# Patient Record
Sex: MALE | Race: WHITE | Employment: FULL TIME | URBAN - NONMETROPOLITAN AREA
[De-identification: names, ages, dates, MRNs, and addresses within clinical notes are randomized per-mention and may not be internally consistent; named-entity substitution may affect disease eponyms.]

---

## 2017-01-25 ENCOUNTER — OFFICE VISIT (OUTPATIENT)
Dept: URGENT CARE | Facility: PHYSICIAN GROUP | Age: 27
End: 2017-01-25
Payer: COMMERCIAL

## 2017-01-25 VITALS
DIASTOLIC BLOOD PRESSURE: 70 MMHG | HEIGHT: 73 IN | HEART RATE: 58 BPM | OXYGEN SATURATION: 97 % | SYSTOLIC BLOOD PRESSURE: 110 MMHG | BODY MASS INDEX: 25.31 KG/M2 | TEMPERATURE: 98.1 F | RESPIRATION RATE: 12 BRPM | WEIGHT: 191 LBS

## 2017-01-25 DIAGNOSIS — L60.0 INGROWN LEFT GREATER TOENAIL: ICD-10-CM

## 2017-01-25 DIAGNOSIS — L60.0 INGROWING TOENAIL WITH INFECTION: ICD-10-CM

## 2017-01-25 PROCEDURE — 99203 OFFICE O/P NEW LOW 30 MIN: CPT | Performed by: PHYSICIAN ASSISTANT

## 2017-01-25 RX ORDER — SULFAMETHOXAZOLE AND TRIMETHOPRIM 800; 160 MG/1; MG/1
1 TABLET ORAL 2 TIMES DAILY
Qty: 20 TAB | Refills: 0 | Status: SHIPPED | OUTPATIENT
Start: 2017-01-25 | End: 2018-05-04

## 2017-01-25 NOTE — PROGRESS NOTES
Chief Complaint   Patient presents with   • Ingrown Toenail     Left great toe 2 months       HISTORY OF PRESENT ILLNESS: Patient is a 26 y.o. male who presents today for evaluation of an ingrown toenail. Patient states he has been fighting the past 2 months. He has been soaking it in warm epsom salt water almost daily since then. He continues to pain, redness, and slight purulent drainage despite the soaking. He stopped soaking it about 3 days ago and the pain, swelling, and erythema became significantly worse. He denies fever, sweats, chills. He has a history of the same on this toe and his other great toe.    There are no active problems to display for this patient.      Allergies:Review of patient's allergies indicates no known allergies.    Current Outpatient Prescriptions Ordered in [facility]   Medication Sig Dispense Refill   • sulfamethoxazole-trimethoprim (BACTRIM DS) 800-160 MG tablet Take 1 Tab by mouth 2 times a day. 20 Tab 0     No current Epic-ordered facility-administered medications on file.       History reviewed. No pertinent past medical history.    Social History   Substance Use Topics   • Smoking status: Never Smoker    • Smokeless tobacco: Never Used   • Alcohol Use: Yes      Comment: occasionally       No family status information on file.   History reviewed. No pertinent family history.    ROS:   Review of Systems   Constitutional: Negative for fever, chills, weight loss and malaise/fatigue.   HENT: Negative for ear pain, nosebleeds, congestion, sore throat and neck pain.    Eyes: Negative for blurred vision.   Respiratory: Negative for cough, sputum production, shortness of breath and wheezing.    Cardiovascular: Negative for chest pain, palpitations, orthopnea and leg swelling.   Gastrointestinal: Negative for heartburn, nausea, vomiting and abdominal pain.   Genitourinary: Negative for dysuria, urgency and frequency.       Exam:  Blood pressure 110/70, pulse 58, temperature 36.7 °C (98.1 °F),  "resp. rate 12, height 1.854 m (6' 1\"), weight 86.637 kg (191 lb), SpO2 97 %.  General: Normal appearing. No distress.  HEENT: Head is grossly normal.  Pulmonary:  No respiratory distress noted.  Cardiovascular: Cap refill is less than 2 seconds on the left great toe.  Extremities: Left great toe with marked erythema, soft tissue swelling or purulent drainage at the medial skin fold of the nail. Significant tenderness to palpation.  Psych: Normal mood. Alert and oriented x3. Judgment and insight is normal.    Assessment/Plan:  Take all medication as directed. Follow-up as needed for nail removal.  1. Ingrown left greater toenail     2. Ingrowing toenail with infection  sulfamethoxazole-trimethoprim (BACTRIM DS) 800-160 MG tablet         "

## 2017-02-01 ENCOUNTER — OFFICE VISIT (OUTPATIENT)
Dept: URGENT CARE | Facility: PHYSICIAN GROUP | Age: 27
End: 2017-02-01
Payer: COMMERCIAL

## 2017-02-01 VITALS
TEMPERATURE: 97.7 F | BODY MASS INDEX: 25.45 KG/M2 | SYSTOLIC BLOOD PRESSURE: 120 MMHG | OXYGEN SATURATION: 95 % | WEIGHT: 192 LBS | DIASTOLIC BLOOD PRESSURE: 70 MMHG | HEART RATE: 71 BPM | HEIGHT: 73 IN | RESPIRATION RATE: 20 BRPM

## 2017-02-01 DIAGNOSIS — L60.0 INGROWN LEFT GREATER TOENAIL: ICD-10-CM

## 2017-02-01 PROCEDURE — 11730 AVULSION NAIL PLATE SIMPLE 1: CPT | Performed by: PHYSICIAN ASSISTANT

## 2017-02-01 NOTE — MR AVS SNAPSHOT
"Yony Her   2017 2:50 PM   Office Visit   MRN: [id number]    Department:  [facility]   Dept Phone:  [phone]    Description:  Male : 1990   Provider:  Kenny Ryan PA-C           Reason for Visit     Toe Pain Left great toenail ingrown      Allergies as of 2017     No Known Allergies      You were diagnosed with     Ingrown left greater toenail   [297339]         Vital Signs     Blood Pressure Pulse Temperature Respirations Height Weight    120/70 mmHg 71 36.5 °C (97.7 °F) 20 1.854 m (6' 1\") 87.091 kg (192 lb)    Body Mass Index Oxygen Saturation Smoking Status             25.34 kg/m2 95% Never Smoker          Basic Information     Date Of Birth Sex Race Ethnicity Preferred Language    1990 Male White Unknown English      Health Maintenance        Date Due Completion Dates    IMM HEP B VACCINE (1 of 3 - Primary Series) 1989 ---    IMM HEP A VACCINE (1 of 2 - Standard Series) 1990 ---    IMM VARICELLA (CHICKENPOX) VACCINE (1 of 2 - 2 Dose Adolescent Series) 2002 ---    IMM DTaP/Tdap/Td Vaccine (1 - Tdap) 2008 ---    IMM INFLUENZA (1) 2016 ---            Current Immunizations     No immunizations on file.      Below and/or attached are the medications your provider expects you to take. Review all of your home medications and newly ordered medications with your provider and/or pharmacist. Follow medication instructions as directed by your provider and/or pharmacist. Please keep your medication list with you and share with your provider. Update the information when medications are discontinued, doses are changed, or new medications (including over-the-counter products) are added; and carry medication information at all times in the event of emergency situations     Allergies:  No Known Allergies          Medications  Valid as of: 2017 -  4:32 PM    Generic Name Brand Name Tablet Size Instructions for use    Sulfamethoxazole-Trimethoprim (Tab) " BACTRIM -160 MG Take 1 Tab by mouth 2 times a day.        .                 Medicines prescribed today were sent to:     [facility] [address] [place] [address], [place] - [address] [facility]    [address] [facility] [address]    Phone: [phone] Fax: [phone]    Open 24 Hours?: No      Medication refill instructions:       If your prescription bottle indicates you have medication refills left, it is not necessary to call your provider’s office. Please contact your pharmacy and they will refill your medication.    If your prescription bottle indicates you do not have any refills left, you may request refills at any time through one of the following ways: The online [email or web address] system (except Urgent Care), by calling your provider’s office, or by asking your pharmacy to contact your provider’s office with a refill request. Medication refills are processed only during regular business hours and may not be available until the next business day. Your provider may request additional information or to have a follow-up visit with you prior to refilling your medication.   *Please Note: Medication refills are assigned a new Rx number when refilled electronically. Your pharmacy may indicate that no refills were authorized even though a new prescription for the same medication is available at the pharmacy. Please request the medicine by name with the pharmacy before contacting your provider for a refill.           [email or web address] Access Code: [email or web address]  Expires: 3/3/2017  4:32 PM    Your email address is not on file at [facility].  Email Addresses are required for you to sign up for [email or web address], please contact [phone] to verify your personal information and to provide your email address prior to attempting to register for [email or web address].    Yony Her  [address] [place] [address]    [email or web address].ma  A secure, online tool to manage your health information     [employer]’s Hmizate.ma® is a secure, online tool that  connects you to your personalized health information from the privacy of your home -- day or night - making it very easy for you to manage your healthcare. Once the activation process is completed, you can even access your medical information using the SafeMedia lou, which is available for free in the Apple Lou store or Google Play store.     To learn more about SafeMedia, visit [email or web address]    There are two levels of access available (as shown below):   My Chart Features  [facility]  Specialists [facility]  Urgent  Care Non-[facility] Doctor   Email your healthcare team securely and privately 24/7 X X X    Manage appointments: schedule your next appointment; view details of past/upcoming appointments X      Request prescription refills. X      View recent personal medical records, including lab and immunizations X X X X   View health record, including health history, allergies, medications X X X X   Read reports about your outpatient visits, procedures, consult and ER notes X X X X   See your discharge summary, which is a recap of your hospital and/or ER visit that includes your diagnosis, lab results, and care plan X X  X     How to register for SafeMedia:  Once your e-mail address has been verified, follow the following steps to sign up for SafeMedia.     1. Go to  [email or web address]  2. Click on the Sign Up Now box, which takes you to the New Member Sign Up page. You will need to provide the following information:  a. Enter your SafeMedia Access Code exactly as it appears at the top of this page. (You will not need to use this code after you’ve completed the sign-up process. If you do not sign up before the expiration date, you must request a new code.)   b. Enter your date of birth.   c. Enter your home email address.   d. Click Submit, and follow the next screen’s instructions.  3. Create a SafeMedia ID. This will be your SafeMedia login ID and cannot be changed, so think of one  that is secure and easy to remember.  4. Create a Pricing Assistant password. You can change your password at any time.  5. Enter your Password Reset Question and Answer. This can be used at a later time if you forget your password.   6. Enter your e-mail address. This allows you to receive e-mail notifications when new information is available in Pricing Assistant.  7. Click Sign Up. You can now view your health information.    For assistance activating your Pricing Assistant account, call [phone]

## 2017-02-02 NOTE — PROGRESS NOTES
This is a 26 y.o. male who presents for removal of ingrowing toenail on the right foot.    HPI: He was placed on antibiotic's a week ago for ingrowing nail with subsequent infection. He is told to come back today if he is still having pain and we would remove the nail. He has had this done multiple times on each foot. This particular one is on his left foot, great toe    No past medical history on file.    No past surgical history on file.    Social History     Social History   • Marital Status: Other     Spouse Name: N/A   • Number of Children: N/A   • Years of Education: N/A     Occupational History   • Not on file.     Social History Main Topics   • Smoking status: Never Smoker    • Smokeless tobacco: Never Used   • Alcohol Use: Yes      Comment: occasionally   • Drug Use: No      Comment: denies h/o use   • Sexual Activity: Not on file     Other Topics Concern   • Not on file     Social History Narrative       No family history on file.    Current Outpatient Prescriptions   Medication Sig Dispense Refill   • sulfamethoxazole-trimethoprim (BACTRIM DS) 800-160 MG tablet Take 1 Tab by mouth 2 times a day. 20 Tab 0     No current facility-administered medications for this visit.       Review of patient's allergies indicates no known allergies.    ROS:    General: Denies fever, chills, acute unexpected weight changes  Skin:  As in HPI  Cardiac:  No recent CP, palpitations, or dyspnea on exertion  Respiratory:  No acute shortness of breath, hemoptysis    PHYSICAL EXAM:  Vital signs reviewed  General:  Well developed, well nourished, NAD  Left lower extremity has ingrowing nail to the medial aspect of the great toe    1. Ingrown left greater toenail         PROCEDURE:    Permit: Procedure, benefits, risks (including those of bleeding, infection, injury, anesthesia, and allergic reaction), and alternatives explained to the patient who voiced understanding of the information. Their questions were sought and answered.  Patient agreed to proceed with the toenail removal.    Indication: Ingrowing nail    Provider: Kenny Ryan PA-C    Anesthesia: Digital block, 4 cc 2% lidocaine without epinephrine    Description: Area prepped and draped in a sterile fashion. Nail removal by blunt dissection from the tissue below, complete removal of nail    Hemostasis / closure: Pressure    Complications: none    Estimated blood loss:  Less than 3 cc.     Disposition: Patient alert, and oriented.  Breathing nonlabored.  Procedure site has been cleaned, dressed, and wound care instructions have been given. Patient is to followup if there is any significant erythema, tenderness, or drainage from the procedure site.

## 2017-04-12 ENCOUNTER — OCCUPATIONAL MEDICINE (OUTPATIENT)
Dept: URGENT CARE | Facility: PHYSICIAN GROUP | Age: 27
End: 2017-04-12

## 2017-04-12 VITALS
HEART RATE: 60 BPM | TEMPERATURE: 98.1 F | SYSTOLIC BLOOD PRESSURE: 110 MMHG | RESPIRATION RATE: 12 BRPM | HEIGHT: 73 IN | WEIGHT: 192 LBS | BODY MASS INDEX: 25.45 KG/M2 | OXYGEN SATURATION: 98 % | DIASTOLIC BLOOD PRESSURE: 70 MMHG

## 2017-04-12 DIAGNOSIS — Z01.10 ENCOUNTER FOR HEARING EXAMINATION: ICD-10-CM

## 2017-04-12 DIAGNOSIS — Z02.1 PRE-EMPLOYMENT EXAMINATION: ICD-10-CM

## 2017-04-12 DIAGNOSIS — Z02.1 PRE-EMPLOYMENT DRUG SCREENING: ICD-10-CM

## 2017-04-12 LAB
AMP AMPHETAMINE: NORMAL
COC COCAINE: NORMAL
INT CON NEG: NORMAL
INT CON POS: NORMAL
MET METHAMPHETAMINES: NORMAL
OPI OPIATES: NORMAL
PCP PHENCYCLIDINE: NORMAL
POC DRUG COMMENT 753798-OCCUPATIONAL HEALTH: NEGATIVE
THC: NORMAL

## 2017-04-12 PROCEDURE — 80305 DRUG TEST PRSMV DIR OPT OBS: CPT | Performed by: PHYSICIAN ASSISTANT

## 2017-04-12 PROCEDURE — 99204 OFFICE O/P NEW MOD 45 MIN: CPT | Performed by: PHYSICIAN ASSISTANT

## 2017-04-12 PROCEDURE — 92553 AUDIOMETRY AIR & BONE: CPT | Performed by: PHYSICIAN ASSISTANT

## 2017-04-12 ASSESSMENT — VISUAL ACUITY
OS_CC: 20/20
OD_CC: 20/100

## 2017-04-12 NOTE — PROGRESS NOTES
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 Patient denies any issues at this time.     There are no active problems to display for this patient.      Allergies:Review of patient's allergies indicates no known allergies.    Current Outpatient Prescriptions Ordered in [facility]   Medication Sig Dispense Refill   • sulfamethoxazole-trimethoprim (BACTRIM DS) 800-160 MG tablet Take 1 Tab by mouth 2 times a day. 20 Tab 0     No current Epic-ordered facility-administered medications on file.       No past medical history on file.    Social History   Substance Use Topics   • Smoking status: Never Smoker    • Smokeless tobacco: Never Used   • Alcohol Use: Yes      Comment: occasionally       No family status information on file.   No family history on file.    Review of Systems:   Constitutional ROS: No unexpected change in weight, No weakness, No fatigue, No unexplained fevers, sweats, or chills  Eye ROS: No recent significant change in vision, No eye pain, redness, discharge  Ear ROS: No ear pain, No drainage, No tinnitus or vertigo  Mouth/Throat ROS: No teeth or gum problems, No bleeding gums, No tongue complaints, No sore throat  Neck ROS: No recent swelling in thyroid area, No significant pain in neck  Pulmonary ROS: No chronic cough, sputum, or hemoptysis, No wheezing, No shortness of breath, No recent change in breathing  Cardiovascular ROS: No chest pain, No dyspnea on exertion, No edema, No palpitations, No syncope  Gastrointestinal ROS: No change in bowel habits, No significant change in appetite, No nausea, vomiting, diarrhea, or constipation, No abdominal bloating or early satiety  Musculoskeletal/Extremities ROS: No peripheral edema, No pain, redness or swelling on the joints  Hematologic/Lymphatic ROS: No chills, No night sweats, No swollen nodes, No weight loss  Skin/Integumentary  "ROS: No evidence of rash, No itching  Neurologic ROS: No chronic headaches, No seizures, No weakness  Psychiatric ROS: No depression, No anxiety, No psychosis    Exam:  Blood pressure 110/70, pulse 60, temperature 36.7 °C (98.1 °F), resp. rate 12, height 1.854 m (6' 1\"), weight 87.091 kg (192 lb), SpO2 98 %.  General:  Well nourished, well developed male in NAD.  Head: Grossly normal.  Eyes: PERRL, no conjunctival injection, visual fields and acuity grossly intact.  ENT: External canals are without any significant edema or erythema. Tympanic membranes are without any inflammation, no effusion. No mucosal edema or discharge noted.Reasonable hygiene, no erythema, exudates or tonsillar enlargement. Good dentition. Lips without lesions.  Neck: Trachea midline. No masses or thyromegaly noted.  Pulmonary: Clear to ausculation and percussion.  Normal effort. No rales, ronchi, or wheezing.  Cardiovascular: Regular rate and rhythm without murmur. Radial and pedal pulses are intact and equal bilaterally.  Back: FROM. No vertebral tenderness noted.  Abdomen: Soft, nondistended, NTTP. No hepatosplenomegaly noted. No pulsatile masses noted.   Lymph: No cervical or supraclavicular lymphadenopathy noted.  Neurologic: Grossly nonfocal. No sensory deficit noted.   Skin: No obvious lesions. Warm, dry, good turgor.  Extremities: No LE edema noted. FROM BUE/BLE. No motor deficit noted.  Psych: Normal mood. Alert and oriented x3. Judgment and insight is normal.    Visual acuity: 20/100 in the right eye. Patient has some sort of scarring in that eye and is unable to wear contact. He usually wears glasses but only has his left contact in at this time. Recommend having his right eye re-examined with his glasses on.    Audiometry testing, per my interpretation: Within acceptable range, no accommodation needed. Recommend wearing hearing protection with all chronic noise but specifically 85 dB and higher. This was discussed with the employee " during the time of visit.    Assessment/Plan:  Cleared for work. 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 Recommend rechecking right eye with glasses on   1. Pre-employment examination     2. Encounter for hearing examination

## 2017-04-12 NOTE — MR AVS SNAPSHOT
"        Yony Her   2017 10:45 AM   Occupational Medicine   MRN: [id number]    Department:  [facility]   Dept Phone:  [phone]    Description:  Male : 1990   Provider:  Liya Olvera PA-C           Reason for Visit     Employment Physical Foritifiber      Allergies as of 2017     No Known Allergies      You were diagnosed with     Pre-employment examination   [585189]       Encounter for hearing examination   [015870]       Pre-employment drug screening   [996040]         Vital Signs     Blood Pressure Pulse Temperature Respirations Height Weight    110/70 mmHg 60 36.7 °C (98.1 °F) 12 1.854 m (6' 1\") 87.091 kg (192 lb)    Body Mass Index Oxygen Saturation Smoking Status             25.34 kg/m2 98% Never Smoker          Basic Information     Date Of Birth Sex Race Ethnicity Preferred Language    1990 Male White Unknown English      Health Maintenance        Date Due Completion Dates    IMM HEP B VACCINE (1 of 3 - Primary Series) 1989 ---    IMM HEP A VACCINE (1 of 2 - Standard Series) 1990 ---    IMM VARICELLA (CHICKENPOX) VACCINE (1 of 2 - 2 Dose Adolescent Series) 2002 ---    IMM DTaP/Tdap/Td Vaccine (1 - Tdap) 2008 ---            Results     POCT 6 Panel Urine Drug Screen      Component    AMPHETAMINE    POC THC    COCAINE    OPIATES    PHENCYCLIDINE    METHAMPHETAMINES    POC Urine Drug Screen Comment    negative    Internal Control Positive    Valid    Internal Control Negative    Valid                        Current Immunizations     No immunizations on file.      Below and/or attached are the medications your provider expects you to take. Review all of your home medications and newly ordered medications with your provider and/or pharmacist. Follow medication instructions as directed by your provider and/or pharmacist. Please keep your medication list with you and share with your provider. Update the information when medications are discontinued, doses are " changed, or new medications (including over-the-counter products) are added; and carry medication information at all times in the event of emergency situations     Allergies:  No Known Allergies          Medications  Valid as of: April 12, 2017 -  2:47 PM    Generic Name Brand Name Tablet Size Instructions for use    Sulfamethoxazole-Trimethoprim (Tab) BACTRIM -160 MG Take 1 Tab by mouth 2 times a day.        .                 Medicines prescribed today were sent to:     [address] [facility]    [address] [facility] [address]    Phone: [phone] Fax: [phone]    Open 24 Hours?: No      Medication refill instructions:       If your prescription bottle indicates you have medication refills left, it is not necessary to call your provider’s office. Please contact your pharmacy and they will refill your medication.    If your prescription bottle indicates you do not have any refills left, you may request refills at any time through one of the following ways: The online Urbita system (except Urgent Care), by calling your provider’s office, or by asking your pharmacy to contact your provider’s office with a refill request. Medication refills are processed only during regular business hours and may not be available until the next business day. Your provider may request additional information or to have a follow-up visit with you prior to refilling your medication.   *Please Note: Medication refills are assigned a new Rx number when refilled electronically. Your pharmacy may indicate that no refills were authorized even though a new prescription for the same medication is available at the pharmacy. Please request the medicine by name with the pharmacy before contacting your provider for a refill.           Urbita Access Code: [email or web address]  Expires: 5/12/2017  2:47 PM    Your email address is not on file at [facility].  Email Addresses are required  for you to sign up for Visiprise, please contact [phone] to verify your personal information and to provide your email address prior to attempting to register for Visiprise.    Yony Arden  [address] [place] [address]    Visiprise  A secure, online tool to manage your health information     [employer]’s Visiprise® is a secure, online tool that connects you to your personalized health information from the privacy of your home -- day or night - making it very easy for you to manage your healthcare. Once the activation process is completed, you can even access your medical information using the Visiprise lou, which is available for free in the Apple Lou store or Google Play store.     To learn more about Visiprise, visit [email or web address]    There are two levels of access available (as shown below):   My Chart Features  [facility]  Specialists [facility]  Urgent  Care Non-[facility] Doctor   Email your healthcare team securely and privately 24/7 X X X    Manage appointments: schedule your next appointment; view details of past/upcoming appointments X      Request prescription refills. X      View recent personal medical records, including lab and immunizations X X X X   View health record, including health history, allergies, medications X X X X   Read reports about your outpatient visits, procedures, consult and ER notes X X X X   See your discharge summary, which is a recap of your hospital and/or ER visit that includes your diagnosis, lab results, and care plan X X  X     How to register for Voiceitt:  Once your e-mail address has been verified, follow the following steps to sign up for Voiceitt.     1. Go to  [email or web address]  2. Click on the Sign Up Now box, which takes you to the New Member Sign Up page. You will need to provide the following information:  a. Enter your Visiprise Access Code exactly as it appears at the top of this page. (You will not need to use  this code after you’ve completed the sign-up process. If you do not sign up before the expiration date, you must request a new code.)   b. Enter your date of birth.   c. Enter your home email address.   d. Click Submit, and follow the next screen’s instructions.  3. Create a SailPlayt ID. This will be your SailPlayt login ID and cannot be changed, so think of one that is secure and easy to remember.  4. Create a SailPlayt password. You can change your password at any time.  5. Enter your Password Reset Question and Answer. This can be used at a later time if you forget your password.   6. Enter your e-mail address. This allows you to receive e-mail notifications when new information is available in [employer].  7. Click Sign Up. You can now view your health information.    For assistance activating your Thinking [employer] Media account, call [phone]

## 2018-05-04 ENCOUNTER — OFFICE VISIT (OUTPATIENT)
Dept: URGENT CARE | Facility: PHYSICIAN GROUP | Age: 28
End: 2018-05-04

## 2018-05-04 VITALS
RESPIRATION RATE: 16 BRPM | OXYGEN SATURATION: 98 % | SYSTOLIC BLOOD PRESSURE: 132 MMHG | HEART RATE: 96 BPM | WEIGHT: 205 LBS | HEIGHT: 73 IN | TEMPERATURE: 98.5 F | BODY MASS INDEX: 27.17 KG/M2 | DIASTOLIC BLOOD PRESSURE: 86 MMHG

## 2018-05-04 DIAGNOSIS — R05.9 COUGH: ICD-10-CM

## 2018-05-04 DIAGNOSIS — J22 LRTI (LOWER RESPIRATORY TRACT INFECTION): ICD-10-CM

## 2018-05-04 DIAGNOSIS — J04.0 LARYNGITIS: ICD-10-CM

## 2018-05-04 LAB
INT CON NEG: NEGATIVE
INT CON POS: POSITIVE
S PYO AG THROAT QL: NEGATIVE

## 2018-05-04 PROCEDURE — 99214 OFFICE O/P EST MOD 30 MIN: CPT | Performed by: NURSE PRACTITIONER

## 2018-05-04 PROCEDURE — 87880 STREP A ASSAY W/OPTIC: CPT | Performed by: NURSE PRACTITIONER

## 2018-05-04 RX ORDER — DOXYCYCLINE HYCLATE 100 MG
100 TABLET ORAL 2 TIMES DAILY
Qty: 14 TAB | Refills: 0 | Status: SHIPPED | OUTPATIENT
Start: 2018-05-04 | End: 2018-05-11

## 2018-05-04 RX ORDER — BENZONATATE 100 MG/1
100 CAPSULE ORAL 3 TIMES DAILY PRN
Qty: 60 CAP | Refills: 0 | Status: SHIPPED | OUTPATIENT
Start: 2018-05-04

## 2018-05-04 ASSESSMENT — ENCOUNTER SYMPTOMS
SHORTNESS OF BREATH: 0
SPUTUM PRODUCTION: 1
CHILLS: 1
MYALGIAS: 1
VOMITING: 0
TROUBLE SWALLOWING: 1
SORE THROAT: 1
EYE PAIN: 0
COUGH: 1
HOARSE VOICE: 1
FEVER: 0
DIZZINESS: 0
WHEEZING: 0
NAUSEA: 0
HEADACHES: 0

## 2018-05-04 NOTE — PROGRESS NOTES
Subjective:     Yony Her is a 27 y.o. male who presents for Laryngitis  Patient presents to clinic today with complaints of a lost voice, sore throat, productive cough ×1 week. Patient has had on-and-off fevers and chills, malaise, body aches. Patient denies any nausea, vomiting, diarrhea. Patient has been taking over-the-counter medications with minimal relief in symptoms.     Pharyngitis    This is a new problem. The current episode started in the past 7 days. The problem has been unchanged. Neither side of throat is experiencing more pain than the other. The maximum temperature recorded prior to his arrival was 100.4 - 100.9 F. The fever has been present for 1 to 2 days. The pain is at a severity of 10/10. The pain is severe. Associated symptoms include congestion, coughing, a hoarse voice, a plugged ear sensation and trouble swallowing. Pertinent negatives include no headaches, shortness of breath or vomiting. He has had no exposure to strep. He has tried acetaminophen and NSAIDs for the symptoms. The treatment provided no relief.   Cough   This is a new problem. The current episode started in the past 7 days. The problem has been unchanged. The problem occurs constantly. The cough is productive of sputum. Associated symptoms include chills, myalgias, nasal congestion, postnasal drip and a sore throat. Pertinent negatives include no chest pain, fever, headaches, rash, shortness of breath or wheezing. Nothing aggravates the symptoms. He has tried OTC cough suppressant for the symptoms. The treatment provided no relief. His past medical history is significant for bronchitis.   No past medical history on file.No past surgical history on file.  Social History     Social History   • Marital status: Other     Spouse name: N/A   • Number of children: N/A   • Years of education: N/A     Occupational History   • Not on file.     Social History Main Topics   • Smoking status: Never Smoker   • Smokeless tobacco:  "Never Used   • Alcohol use Yes      Comment: occasionally   • Drug use: No      Comment: denies h/o use   • Sexual activity: Not on file     Other Topics Concern   • Not on file     Social History Narrative   • No narrative on file    No family history on file. Review of Systems   Constitutional: Positive for chills and malaise/fatigue. Negative for fever.   HENT: Positive for congestion, hoarse voice, postnasal drip, sore throat and trouble swallowing.    Eyes: Negative for pain.   Respiratory: Positive for cough and sputum production. Negative for shortness of breath and wheezing.    Cardiovascular: Negative for chest pain.   Gastrointestinal: Negative for nausea and vomiting.   Genitourinary: Negative for hematuria.   Musculoskeletal: Positive for myalgias.   Skin: Negative for rash.   Neurological: Negative for dizziness and headaches.   No Known Allergies   Objective:   /86   Pulse 96   Temp 36.9 °C (98.5 °F)   Resp 16   Ht 1.854 m (6' 1\")   Wt 93 kg (205 lb)   SpO2 98%   BMI 27.05 kg/m²   Physical Exam   Constitutional: He is oriented to person, place, and time. He appears well-developed and well-nourished. No distress.   HENT:   Head: Normocephalic and atraumatic.   Right Ear: Tympanic membrane normal.   Left Ear: Tympanic membrane normal.   Nose: Nose normal. Right sinus exhibits no maxillary sinus tenderness and no frontal sinus tenderness. Left sinus exhibits no maxillary sinus tenderness and no frontal sinus tenderness.   Mouth/Throat: Uvula is midline and mucous membranes are normal. Posterior oropharyngeal erythema present. No posterior oropharyngeal edema or tonsillar abscesses. No tonsillar exudate.   Patient with a hoarse tone of voice   Eyes: Conjunctivae and EOM are normal. Pupils are equal, round, and reactive to light. Right eye exhibits no discharge. Left eye exhibits no discharge.   Cardiovascular: Normal rate and regular rhythm.    No murmur heard.  Pulmonary/Chest: Effort normal " and breath sounds normal. No respiratory distress. He has no decreased breath sounds. He has no wheezes. He has no rhonchi. He has no rales.   Patient coughing throughout exam   Abdominal: Soft. He exhibits no distension. There is no tenderness.   Lymphadenopathy:     He has cervical adenopathy.   Neurological: He is alert and oriented to person, place, and time. He has normal reflexes. No sensory deficit.   Skin: Skin is warm, dry and intact.   Psychiatric: He has a normal mood and affect.         Assessment/Plan:   Assessment    1. Laryngitis  2. LRTI (lower respiratory tract infection)  3. Cough  - POCT Rapid Strep A  - doxycycline (VIBRAMYCIN) 100 MG Tab; Take 1 Tab by mouth 2 times a day for 7 days.  Dispense: 14 Tab; Refill: 0  - benzonatate (TESSALON) 100 MG Cap; Take 1 Cap by mouth 3 times a day as needed for Cough.  Dispense: 60 Cap; Refill: 0    Strep negative    Discussed viral versus bacterial discussed supportive care at this time.. Patient was self pay and requesting prescription if symptoms do not improve so he does not have to return to clinic in pay to be reevaluated. Contingent antibiotic prescription given to patient to fill upon meeting criteria of guidelines discussed.       Patient given precautionary s/sx that mandate immediate follow up and evaluation in the ED. Advised of risks of not doing so.    DDX, Supportive care, and indications for immediate follow-up discussed with patient.    Instructed to return to clinic or nearest emergency department if we are not available for any change in condition, further concerns, or worsening of symptoms.    The patient demonstrated a good understanding and agreed with the treatment plan.

## 2019-04-04 ENCOUNTER — OFFICE VISIT (OUTPATIENT)
Dept: URGENT CARE | Facility: PHYSICIAN GROUP | Age: 29
End: 2019-04-04

## 2019-04-04 VITALS
RESPIRATION RATE: 16 BRPM | HEART RATE: 68 BPM | SYSTOLIC BLOOD PRESSURE: 118 MMHG | OXYGEN SATURATION: 100 % | BODY MASS INDEX: 26.12 KG/M2 | DIASTOLIC BLOOD PRESSURE: 60 MMHG | TEMPERATURE: 98.3 F | WEIGHT: 198 LBS

## 2019-04-04 DIAGNOSIS — G44.209 ACUTE NON INTRACTABLE TENSION-TYPE HEADACHE: ICD-10-CM

## 2019-04-04 DIAGNOSIS — M62.838 SPASM OF CERVICAL PARASPINOUS MUSCLE: ICD-10-CM

## 2019-04-04 DIAGNOSIS — S39.012A ACUTE MYOFASCIAL STRAIN OF LUMBOSACRAL REGION, INITIAL ENCOUNTER: Primary | ICD-10-CM

## 2019-04-04 DIAGNOSIS — S29.019A ACUTE THORACIC MYOFASCIAL STRAIN, INITIAL ENCOUNTER: ICD-10-CM

## 2019-04-04 PROCEDURE — 99214 OFFICE O/P EST MOD 30 MIN: CPT | Performed by: PHYSICIAN ASSISTANT

## 2019-04-04 RX ORDER — ACETAMINOPHEN 500 MG
500-1000 TABLET ORAL EVERY 6 HOURS PRN
COMMUNITY

## 2019-04-04 RX ORDER — CYCLOBENZAPRINE HCL 10 MG
10 TABLET ORAL 3 TIMES DAILY PRN
Qty: 30 TAB | Refills: 0 | Status: SHIPPED | OUTPATIENT
Start: 2019-04-04

## 2019-04-04 RX ORDER — PREDNISONE 20 MG/1
20 TABLET ORAL DAILY
Qty: 5 TAB | Refills: 0 | Status: SHIPPED | OUTPATIENT
Start: 2019-04-04 | End: 2019-04-09

## 2019-04-04 ASSESSMENT — ENCOUNTER SYMPTOMS
HEADACHES: 1
SENSORY CHANGE: 0
MYALGIAS: 1
DIZZINESS: 0
WEAKNESS: 0
TREMORS: 0
FOCAL WEAKNESS: 0
NECK PAIN: 1
FEVER: 0
BOWEL INCONTINENCE: 0
TINGLING: 0
ABDOMINAL PAIN: 0
NUMBNESS: 0
BACK PAIN: 1
FALLS: 0
LEG PAIN: 0

## 2019-04-04 NOTE — PATIENT INSTRUCTIONS
Radicular Pain  Introduction  Radicular pain is a type of pain that spreads from your back or neck along a spinal nerve. Spinal nerves are nerves that leave the spinal cord and go to the muscles. Radicular pain occurs when one of these nerves becomes irritated or squeezed (compressed). Radicular pain is sometimes called radiculopathy, radiculitis, or a pinched nerve. When you have this type of pain, you may also have weakness, numbness, or tingling in the area of your body that is supplied by the nerve. The pain may feel sharp and burning.  Spinal nerves leave the spinal cord through openings between the 24 bones (vertebrae) that make up the spine. Radicular pain is often caused by something pushing on a spinal nerve. This pushing may be done by a vertebra or by one of the round cushions between vertebrae (intervertebral disks). This can result from an injury, from wear and tear or aging of a disk, or from the growth of a bone spur that pushes on the nerve. Radicular pain can occur in various areas depending on which spinal nerve is affected:  · Cervical radicular pain occurs in the neck. You may also feel pain, numbness, weakness, or tingling in the arms.  · Thoracic radicular pain occurs in the mid-spine area. You would feel this pain in the back and chest. This type is rare.  · Lumbar radicular pain occurs in the lower back area. You would feel this pain as low back pain. You may feel pain, numbness, weakness, or tingling in the buttocks or legs. Sciatica is a type of lumbar radicular pain that shoots down the back of the leg.  Radicular pain often goes away when you follow instructions from your health care provider for relieving pain at home.  Follow these instructions at home:  Managing pain  · If directed, apply ice to the affected area:  ¨ Put ice in a plastic bag.  ¨ Place a towel between your skin and the bag.  ¨ Leave the ice on for 20 minutes, 2-3 times a day.  · If directed, apply heat to the affected  area as often as told by your health care provider. Use the heat source that your health care provider recommends, such as a moist heat pack or a heating pad.  ¨ Place a towel between your skin and the heat source.  ¨ Leave the heat on for 20-30 minutes.  ¨ Remove the heat if your skin turns bright red. This is especially important if you are unable to feel pain, heat, or cold. You may have a greater risk of getting burned.  Activity  · Do not sit or rest in bed for long periods of time.  · Try to stay as active as possible. Ask your health care provider what type of exercise or activity is best for you.  · Avoid activities that make your pain worse, such as bending and lifting.  · Do not lift anything that is heavier than 10 lb (4.5 kg). Practice using proper technique when lifting items. Proper lifting technique involves bending your knees and rising up.  · Do strength and range-of-motion exercises only as told by your health care provider.  General instructions  · Take over-the-counter and prescription medicines only as told by your health care provider.  · Pay attention to any changes in your symptoms.  · Keep all follow-up visits as told by your health care provider. This is important.  Contact a health care provider if:  · Your pain and other symptoms get worse.  · Your pain medicine is not helping.  · Your pain has not improved after a few weeks of home care.  · You have a fever.  Get help right away if:  · You have severe pain, weakness, or numbness.  · You have difficulty with bladder or bowel control.  This information is not intended to replace advice given to you by your health care provider. Make sure you discuss any questions you have with your health care provider.  Document Released: 01/25/2006 Document Revised: 05/25/2017 Document Reviewed: 07/13/2016  © 2017 [employer]

## 2019-04-04 NOTE — PROGRESS NOTES
Subjective:      Yony Her is a 28 y.o. male who presents with Back Pain (mid back to lower back(R) side-causing nausea, migraines, neck pain )    PMH:Reviewed with patient/family member/EPIC.   MEDS:   Current Outpatient Prescriptions:   •  acetaminophen (TYLENOL) 500 MG Tab, Take 500-1,000 mg by mouth every 6 hours as needed., Disp: , Rfl:   •  cyclobenzaprine (FLEXERIL) 10 MG Tab, Take 1 Tab by mouth 3 times a day as needed., Disp: 30 Tab, Rfl: 0  •  predniSONE (DELTASONE) 20 MG Tab, Take 1 Tab by mouth every day for 5 days., Disp: 5 Tab, Rfl: 0  •  benzonatate (TESSALON) 100 MG Cap, Take 1 Cap by mouth 3 times a day as needed for Cough. (Patient not taking: Reported on 4/4/2019), Disp: 60 Cap, Rfl: 0  ALLERGIES: No Known Allergies  SURGHX: No past surgical history on file.  SOCHX:  reports that he has never smoked. He has never used smokeless tobacco. He reports that he drinks alcohol. He reports that he does not use drugs.  FH: Reviewed with patient, not pertinent to this visit.           Patient presents with:  Back Pain: mid back to lower back(R) side-causing nausea, migraines, neck pain.      Patient presents clinic today with progressively worsening right-sided back pain that began for 5 days ago.  Patient states he noticed some pain in his low back first with some muscle spasm on the right side that occasionally radiated down his leg, then pain began radiating up to his upper back and neck over the course of the last few days.  Patient states he started to have a headache today from the muscle spasm in his neck and decided to come in for evaluation and possibly some muscle relaxants.  Patient patient has strained his back in the past, feels very similar.  No particular injury, slip, trip or fall.  Patient does do a lot of repetitive bending lifting stooping at work but denies any particular injury, stating this is not work-related as he does those activities every day at work.  Patient has tried some  over-the-counter Tylenol without improvement.    Back Pain   This is a new problem. The current episode started in the past 7 days. The problem occurs daily. The problem has been gradually worsening since onset. The pain is present in the lumbar spine and thoracic spine. The quality of the pain is described as aching, burning and cramping. The pain does not radiate. The pain is at a severity of 6/10. The pain is moderate. The pain is the same all the time. The symptoms are aggravated by bending, standing, twisting and position. Stiffness is present at night. Associated symptoms include headaches. Pertinent negatives include no abdominal pain, bladder incontinence, bowel incontinence, fever, leg pain, numbness, tingling or weakness. Treatments tried: tylenol  The treatment provided no relief.       Review of Systems   Constitutional: Negative for fever.   Gastrointestinal: Negative for abdominal pain and bowel incontinence.   Genitourinary: Negative for bladder incontinence.   Musculoskeletal: Positive for back pain, myalgias and neck pain. Negative for falls and joint pain.   Neurological: Positive for headaches. Negative for dizziness, tingling, tremors, sensory change, focal weakness, weakness and numbness.   All other systems reviewed and are negative.         Objective:     /60   Pulse 68   Temp 36.8 °C (98.3 °F)   Resp 16   Wt 89.8 kg (198 lb)   SpO2 100%   BMI 26.12 kg/m²      Physical Exam   Constitutional: He is oriented to person, place, and time. He appears well-developed and well-nourished. No distress.   HENT:   Head: Normocephalic and atraumatic.   Nose: Nose normal.   Eyes: Pupils are equal, round, and reactive to light. EOM are normal.   Neck: Normal range of motion. Neck supple.   Cardiovascular: Normal rate, regular rhythm, normal heart sounds and intact distal pulses.    Pulmonary/Chest: Effort normal and breath sounds normal.   Abdominal: Soft. Bowel sounds are normal.      Musculoskeletal:        Cervical back: He exhibits pain and spasm. He exhibits no bony tenderness.        Thoracic back: He exhibits pain and spasm. He exhibits normal range of motion and no bony tenderness.        Lumbar back: He exhibits decreased range of motion (Secondary to pain/spasm), tenderness, pain and spasm. He exhibits no bony tenderness and normal pulse.        Back:    DISTAL N/V INTACT TO BLE, NO SADDLE ANESTHESIA NOTED, NO MIDLINE TTP TO ENTIRE SPINE.    Neurological: He is alert and oriented to person, place, and time. He has normal reflexes. He exhibits normal muscle tone. Coordination normal.   Skin: Skin is warm and dry.   Psychiatric: He has a normal mood and affect.   Nursing note and vitals reviewed.         Assessment/Plan:     1. Acute myofascial strain of lumbosacral region, initial encounter  acetaminophen (TYLENOL) 500 MG Tab    cyclobenzaprine (FLEXERIL) 10 MG Tab    predniSONE (DELTASONE) 20 MG Tab   2. Acute thoracic myofascial strain, initial encounter  acetaminophen (TYLENOL) 500 MG Tab    cyclobenzaprine (FLEXERIL) 10 MG Tab    predniSONE (DELTASONE) 20 MG Tab   3. Spasm of cervical paraspinous muscle  acetaminophen (TYLENOL) 500 MG Tab    cyclobenzaprine (FLEXERIL) 10 MG Tab    predniSONE (DELTASONE) 20 MG Tab   4. Acute non intractable tension-type headache  acetaminophen (TYLENOL) 500 MG Tab    cyclobenzaprine (FLEXERIL) 10 MG Tab    predniSONE (DELTASONE) 20 MG Tab     Motrin/Advil/Ibuprophen 600 mg every 6 hours as needed for pain or fever.    PT instructed not to drive or operate heavy machinery or drink alcohol while taking this medication because it contains either a narcotic or benzodiazepines which causes drowsiness. PT verbalized understanding of these instructions.     [facility] Aware web site evaluation: I have obtained and reviewed patient utilization report from [facility] pharmacy database prior to writing prescription for controlled substance.  No history of  abuse.    PT should follow up with PCP in 1-2 days for re-evaluation if symptoms have not improved.  Discussed red flags and reasons to return to UC or ED.  Pt and/or family verbalized understanding of diagnosis and follow up instructions and was offered informational handout on diagnosis.  PT discharged.

## 2019-04-04 NOTE — LETTER
April 4, 2019         Patient: Yony Her   YOB: 1990   Date of Visit: 4/4/2019           To Whom it May Concern:    Yony Her was seen in my clinic on 4/4/2019. He may return to work on 4/05/2019.   If you have any questions or concerns, please don't hesitate to call.        Sincerely,           Daisy Samuel P.A.-C.  Electronically Signed

## 2023-08-20 ENCOUNTER — OFFICE VISIT (OUTPATIENT)
Dept: URGENT CARE | Facility: PHYSICIAN GROUP | Age: 33
End: 2023-08-20
Payer: COMMERCIAL

## 2023-08-20 VITALS
SYSTOLIC BLOOD PRESSURE: 124 MMHG | OXYGEN SATURATION: 98 % | DIASTOLIC BLOOD PRESSURE: 64 MMHG | TEMPERATURE: 96.6 F | BODY MASS INDEX: 27.77 KG/M2 | HEART RATE: 65 BPM | RESPIRATION RATE: 18 BRPM | HEIGHT: 72 IN | WEIGHT: 205 LBS

## 2023-08-20 DIAGNOSIS — Z91.09 ENVIRONMENTAL ALLERGIES: ICD-10-CM

## 2023-08-20 DIAGNOSIS — R06.2 WHEEZING: ICD-10-CM

## 2023-08-20 DIAGNOSIS — B96.89 ACUTE BACTERIAL SINUSITIS: ICD-10-CM

## 2023-08-20 DIAGNOSIS — J01.90 ACUTE BACTERIAL SINUSITIS: ICD-10-CM

## 2023-08-20 PROCEDURE — 3078F DIAST BP <80 MM HG: CPT

## 2023-08-20 PROCEDURE — 3074F SYST BP LT 130 MM HG: CPT

## 2023-08-20 PROCEDURE — 99203 OFFICE O/P NEW LOW 30 MIN: CPT

## 2023-08-20 RX ORDER — ALBUTEROL SULFATE 90 UG/1
2 AEROSOL, METERED RESPIRATORY (INHALATION) EVERY 6 HOURS PRN
Qty: 8.5 G | Refills: 0 | Status: SHIPPED | OUTPATIENT
Start: 2023-08-20

## 2023-08-20 RX ORDER — AMOXICILLIN AND CLAVULANATE POTASSIUM 875; 125 MG/1; MG/1
1 TABLET, FILM COATED ORAL 2 TIMES DAILY
Qty: 14 TABLET | Refills: 0 | Status: SHIPPED | OUTPATIENT
Start: 2023-08-20 | End: 2023-08-27

## 2023-08-20 RX ORDER — TRIAMCINOLONE ACETONIDE 40 MG/ML
40 INJECTION, SUSPENSION INTRA-ARTICULAR; INTRAMUSCULAR ONCE
Status: COMPLETED | OUTPATIENT
Start: 2023-08-20 | End: 2023-08-20

## 2023-08-20 RX ADMIN — TRIAMCINOLONE ACETONIDE 40 MG: 40 INJECTION, SUSPENSION INTRA-ARTICULAR; INTRAMUSCULAR at 10:06

## 2023-08-20 ASSESSMENT — ENCOUNTER SYMPTOMS
HEMOPTYSIS: 0
SINUS PAIN: 1
WHEEZING: 1
SHORTNESS OF BREATH: 0
SWOLLEN GLANDS: 0
STRIDOR: 0
HEADACHES: 1
HOARSE VOICE: 0
NECK PAIN: 0
SPUTUM PRODUCTION: 1
SORE THROAT: 0
SINUS PRESSURE: 1
CHILLS: 0
WEIGHT LOSS: 0
DIAPHORESIS: 0
FEVER: 0
COUGH: 1

## 2023-08-20 NOTE — PATIENT INSTRUCTIONS
"As we discussed your clinical condition would benefit from over-the-counter remedies:    FLONASE (once per day)  You may consider intranasal steroids such as fluticasone (brand name Flonase), (other options include Nasonex, Rhinocort, Nasacort) daily; continue for at least 2-3 weeks. Any generic should work as well but may cause slightly more nasal irritation. Please take according to package directions.  This steroid will help reduce inflammation of your sinuses.  This is the number one medication to help with seasonal allergies and treat nasal inflammation.  Cost: around $8 at [facility] for the generic fluticasone [facility] product (as of 5/20).    ANTIHISTAMINES  You may take a non-sedating antihistamine like Zyrtec (cetirizine) , Allegra (fexofenadine), Xyzal (levocetirizine), or Claritin (loratadine).  This will help \"dry you out\" and reduce the amount of nasal inflammation.  Follow package directions paying attention to whether they are once or twice daily.  Note: if there is a \"D\" such kristie Allegra-D it also contains a decongestant such as sudafed.  Some patients benefit from alternating these medications every few weeks but literature does not support this.   Cost: around $11 at [employer] for the generic cetirizine [facility] branded product (as of 5/20)    SUDAFED (low dose to see if tolerated)  You may consider over-the-counter Sudafed (pseudoephedrine) to act as a decongestant to improve your breathing through your nose.  Please do not use this medication if you already have known high blood pressure.  Please take according to package directions and note that this has a stimulating effect and should not be taken late in the day.  There is a low dose 12 hour formulation and a higher dose 24 hour formulation.  Start with a low dose to make sure you tolerate the medication.  Do not take late in the day as it may interfere with sleep.   Cost: Around $6 for the generic [facility] branded product at a 10mg dose (as of " "2/2023).      SALINE IRRIGATION/SPRAY  You may consider using a saline nasal irrigation (such as a \"Neti pot\" or similar device using sterile water, NOT tap water) or OTC saline nasal spray      NSAIDs  You may take Ibuprofen (brand name Motrin or Advil) may be taken 800 mg up to three times per day taken with food (do not exceed 2400 mg per day).  If you prefer you may consider Naproxen (brand name Naprosyn or Aleve) around 500 mg twice per day with food (do not exceed 1200 mg per day). Please do not take if you have known stomach ulcers or known kidney disease.     TYLENOL  You may take Tylenol according to package directions (1000 mg every 8 hours not to exceed 3000 mg per day).  Please do not consume with any alcohol products, or if you have known or suspected liver disease. These will help reduce inflammation, help with pain control, and can reduce fevers.    "

## 2023-08-20 NOTE — PROGRESS NOTES
Subjective:   Yony Her is a very pleasant 33 y.o. male who presents for:    Chief Complaint   Patient presents with    Sinus Problem     X 3 wks. Thought sx were allergies. Otc not helping.       Congestion    Cough     Wheezing        HPI:    Sinus Problem  This is a new problem. Episode onset: three weeks ago. There has been no fever. Associated symptoms include congestion, coughing, headaches, sinus pressure and sneezing. Pertinent negatives include no chills, diaphoresis, ear pain, hoarse voice, neck pain, shortness of breath, sore throat or swollen glands. (Cough, chest tightness ) Treatments tried: benadyl, Claritin, Allegra. The treatment provided mild relief.       ROS:    Review of Systems   Constitutional:  Positive for malaise/fatigue. Negative for chills, diaphoresis, fever and weight loss.   HENT:  Positive for congestion, sinus pressure, sinus pain and sneezing. Negative for ear discharge, ear pain, hoarse voice, sore throat and tinnitus.    Respiratory:  Positive for cough, sputum production and wheezing. Negative for hemoptysis, shortness of breath and stridor.    Musculoskeletal:  Negative for neck pain.   Skin:  Negative for rash.   Neurological:  Positive for headaches.   All other systems reviewed and are negative.      Medications:      Current Outpatient Medications   Medication Sig    acetaminophen (TYLENOL) 500 MG Tab Take 500-1,000 mg by mouth every 6 hours as needed.    cyclobenzaprine (FLEXERIL) 10 MG Tab Take 1 Tab by mouth 3 times a day as needed.    benzonatate (TESSALON) 100 MG Cap Take 1 Cap by mouth 3 times a day as needed for Cough. (Patient not taking: Reported on 4/4/2019)       Allergies:     No Known Allergies    Problem List:     There is no problem list on file for this patient.      Surgical History:    No past surgical history on file.    Past Social Hx:     Social History     Socioeconomic History    Marital status: Other   Tobacco Use    Smoking status: Never     Smokeless tobacco: Never   Substance and Sexual Activity    Alcohol use: Yes     Comment: occasionally    Drug use: No     Comment: denies h/o use        Past Family Hx:      No family history on file.    Problem list, medications, and allergies reviewed by myself today in Epic.     Objective:     Vitals:    08/20/23 0924   BP: 124/64   Pulse: 65   Resp: 18   Temp: 35.9 °C (96.6 °F)   SpO2: 98%       Physical Exam  Vitals reviewed.   Constitutional:       General: He is not in acute distress.     Appearance: Normal appearance. He is normal weight. He is not ill-appearing, toxic-appearing or diaphoretic.   HENT:      Head: Normocephalic and atraumatic.      Right Ear: Tympanic membrane, ear canal and external ear normal.      Left Ear: Tympanic membrane, ear canal and external ear normal.      Nose: Congestion and rhinorrhea present.      Right Sinus: Maxillary sinus tenderness and frontal sinus tenderness present.      Left Sinus: Maxillary sinus tenderness and frontal sinus tenderness present.      Mouth/Throat:      Mouth: Mucous membranes are moist.      Pharynx: Oropharynx is clear.   Eyes:      Extraocular Movements: Extraocular movements intact.      Conjunctiva/sclera: Conjunctivae normal.      Pupils: Pupils are equal, round, and reactive to light.   Cardiovascular:      Rate and Rhythm: Normal rate and regular rhythm.      Chest Wall: PMI is not displaced. No thrill.      Pulses: Normal pulses. No decreased pulses.      Heart sounds: Normal heart sounds. Heart sounds not distant. No murmur heard.     No systolic murmur is present.      No diastolic murmur is present.      No friction rub. No gallop. No S3 or S4 sounds.   Pulmonary:      Effort: Pulmonary effort is normal.      Breath sounds: Normal breath sounds.   Abdominal:      General: Abdomen is flat. Bowel sounds are normal.      Palpations: Abdomen is soft.   Musculoskeletal:         General: Normal range of motion.      Cervical back: Normal range  of motion and neck supple.      Right lower leg: No edema.      Left lower leg: No edema.   Skin:     General: Skin is warm and dry.   Neurological:      General: No focal deficit present.      Mental Status: He is alert and oriented to person, place, and time. Mental status is at baseline.   Psychiatric:         Mood and Affect: Mood normal.         Behavior: Behavior normal.         Thought Content: Thought content normal.         Judgment: Judgment normal.                   Assessment/Plan:     Diagnosis and associated orders:     1. Acute bacterial sinusitis  - amoxicillin-clavulanate (AUGMENTIN) 875-125 MG Tab; Take 1 Tablet by mouth 2 times a day for 7 days.  Dispense: 14 Tablet; Refill: 0    2. Environmental allergies  - triamcinolone acetonide (Kenalog-40) injection 40 mg    3. Wheezing  - albuterol 108 (90 Base) MCG/ACT Aero Soln inhalation aerosol; Inhale 2 Puffs every 6 hours as needed for Shortness of Breath.  Dispense: 8.5 g; Refill: 0          Comments/MDM:     Prescription for Augmentin sent to patient's preferred pharmacy for the treatment of acute bacterial rhinosinusitis.  Albuterol inhaler as needed for wheezing  Triamcinolone IM injection provided in clinic.  Patient tolerated this well.  For congestion, I recommended OTC medication containing decongestant, Flonase, antihistamine, saline rinses, OTC chest rub, and cool mist humidification.  Symptom management discussed with patient in clinic.  OTC Tylenol or Motrin for fever/discomfort.  Drink plenty of fluids  Follow-up with PCP within the 7 days if symptoms do not improve with treatment  Return to clinic or go to the ED if symptoms worsen or fail to improve, or if the patient should develop worsening/increasing sinus pain/pressure, congestion, ear pain, sore throat, headache, cough, shortness of breath, wheezing, chest pain, fever/chills, and/or any concerning symptoms.           All questions answered. Patient verbalized understanding and is  in agreement with this plan of care.     If symptoms are worsening or not improving in 3-5 days, follow-up with PCP or return to UC. Differential diagnosis, natural history, and supportive care discussed. AVS handout given and reviewed with patient. Patient educated on red flags and when to seek treatment back in ED or UC.     I personally reviewed prior external notes and test results pertinent to today's visit.  I have independently reviewed and interpreted all diagnostics ordered during this urgent care visit.     This dictation has been created using voice recognition software. The accuracy of the dictation is limited by the abilities of the software. I expect there may be some errors of grammar and possibly content. I made every attempt to manually correct the errors within my dictation. However, errors related to voice recognition software may still exist and should be interpreted within the appropriate context.    This note was electronically signed by DEEPIKA Garcia

## 2023-08-20 NOTE — LETTER
August 20, 2023    To Whom It May Concern:         This is confirmation that Yony Her attended his scheduled appointment with DEEPIKA Castillo on 8/20/23.         If you have any questions please do not hesitate to call me at the phone number listed below.    Sincerely,          ELVIS Castillo.  [phone]